# Patient Record
Sex: MALE | Race: OTHER | Employment: FULL TIME | URBAN - METROPOLITAN AREA
[De-identification: names, ages, dates, MRNs, and addresses within clinical notes are randomized per-mention and may not be internally consistent; named-entity substitution may affect disease eponyms.]

---

## 2023-11-22 ENCOUNTER — OFFICE VISIT (OUTPATIENT)
Age: 63
End: 2023-11-22
Payer: COMMERCIAL

## 2023-11-22 VITALS
SYSTOLIC BLOOD PRESSURE: 152 MMHG | DIASTOLIC BLOOD PRESSURE: 100 MMHG | WEIGHT: 227 LBS | HEART RATE: 83 BPM | HEIGHT: 64 IN | BODY MASS INDEX: 38.76 KG/M2

## 2023-11-22 DIAGNOSIS — R06.02 SOB (SHORTNESS OF BREATH): Primary | ICD-10-CM

## 2023-11-22 DIAGNOSIS — I10 HYPERTENSION, UNSPECIFIED TYPE: ICD-10-CM

## 2023-11-22 DIAGNOSIS — R07.89 ATYPICAL CHEST PAIN: ICD-10-CM

## 2023-11-22 DIAGNOSIS — R06.83 SNORING: ICD-10-CM

## 2023-11-22 PROCEDURE — 99204 OFFICE O/P NEW MOD 45 MIN: CPT | Performed by: INTERNAL MEDICINE

## 2023-11-22 PROCEDURE — 3080F DIAST BP >= 90 MM HG: CPT | Performed by: INTERNAL MEDICINE

## 2023-11-22 PROCEDURE — 93000 ELECTROCARDIOGRAM COMPLETE: CPT | Performed by: INTERNAL MEDICINE

## 2023-11-22 PROCEDURE — 3077F SYST BP >= 140 MM HG: CPT | Performed by: INTERNAL MEDICINE

## 2023-11-22 RX ORDER — TAMSULOSIN HYDROCHLORIDE 0.4 MG/1
0.4 CAPSULE ORAL NIGHTLY
COMMUNITY
Start: 2023-10-27

## 2023-11-22 RX ORDER — LISINOPRIL 40 MG/1
40 TABLET ORAL DAILY
COMMUNITY
Start: 2023-11-07

## 2023-11-22 RX ORDER — AMLODIPINE BESYLATE 10 MG/1
10 TABLET ORAL DAILY
Qty: 30 TABLET | Refills: 11 | COMMUNITY
Start: 2023-11-15 | End: 2024-11-06

## 2023-11-22 RX ORDER — CARVEDILOL 6.25 MG/1
6.25 TABLET ORAL 2 TIMES DAILY
COMMUNITY
Start: 2023-11-15

## 2023-11-23 DIAGNOSIS — I10 HYPERTENSION, UNSPECIFIED TYPE: Primary | ICD-10-CM

## 2023-11-23 LAB
ANION GAP SERPL CALC-SCNC: 5 MMOL/L (ref 2–11)
BUN SERPL-MCNC: 9 MG/DL (ref 8–23)
CALCIUM SERPL-MCNC: 9 MG/DL (ref 8.3–10.4)
CHLORIDE SERPL-SCNC: 106 MMOL/L (ref 101–110)
CO2 SERPL-SCNC: 28 MMOL/L (ref 21–32)
CREAT SERPL-MCNC: 0.9 MG/DL (ref 0.8–1.5)
GLUCOSE SERPL-MCNC: 127 MG/DL (ref 65–100)
MAGNESIUM SERPL-MCNC: 2.3 MG/DL (ref 1.8–2.4)
POTASSIUM SERPL-SCNC: 3.9 MMOL/L (ref 3.5–5.1)
SODIUM SERPL-SCNC: 139 MMOL/L (ref 133–143)

## 2023-11-23 RX ORDER — TRIAMTERENE AND HYDROCHLOROTHIAZIDE 37.5; 25 MG/1; MG/1
1 TABLET ORAL DAILY
Qty: 90 TABLET | Refills: 3 | Status: SHIPPED | OUTPATIENT
Start: 2023-11-23

## 2023-11-27 ENCOUNTER — TELEPHONE (OUTPATIENT)
Age: 63
End: 2023-11-27

## 2023-11-27 NOTE — TELEPHONE ENCOUNTER
Bhupinder Guzman of lab results and Dr. Melly Bills response. Bhupinder Guzman that patient may go to any [facility] outpatient lab in 1 week for BMP and magnesium. Tiff Obregon verbalized understanding.

## 2023-11-27 NOTE — TELEPHONE ENCOUNTER
----- Message from Jack Cee MD sent at 11/23/2023  8:20 AM EST -----  Please let the patient know that his lab work is acceptable for a thiazide diuretic; therefore, I started the medication. He can pick it up from the pharmacy. I ordered a chemistry profile to be repeated in 1 week. Please encourage him to get his lab work at that time.

## 2023-12-07 DIAGNOSIS — I10 HYPERTENSION, UNSPECIFIED TYPE: ICD-10-CM

## 2023-12-07 DIAGNOSIS — I10 HYPERTENSION, UNSPECIFIED TYPE: Primary | ICD-10-CM

## 2023-12-07 LAB
ANION GAP SERPL CALC-SCNC: 5 MMOL/L (ref 2–11)
BUN SERPL-MCNC: 19 MG/DL (ref 8–23)
CALCIUM SERPL-MCNC: 9.4 MG/DL (ref 8.3–10.4)
CHLORIDE SERPL-SCNC: 106 MMOL/L (ref 103–113)
CO2 SERPL-SCNC: 28 MMOL/L (ref 21–32)
CREAT SERPL-MCNC: 1.5 MG/DL (ref 0.8–1.5)
GLUCOSE SERPL-MCNC: 107 MG/DL (ref 65–100)
MAGNESIUM SERPL-MCNC: 2.4 MG/DL (ref 1.8–2.4)
POTASSIUM SERPL-SCNC: 3.9 MMOL/L (ref 3.5–5.1)
SODIUM SERPL-SCNC: 139 MMOL/L (ref 136–146)

## 2023-12-07 RX ORDER — CARVEDILOL 12.5 MG/1
12.5 TABLET ORAL 2 TIMES DAILY
Qty: 180 TABLET | Refills: 3 | Status: SHIPPED | OUTPATIENT
Start: 2023-12-07

## 2023-12-08 ENCOUNTER — TELEPHONE (OUTPATIENT)
Age: 63
End: 2023-12-08

## 2023-12-08 NOTE — TELEPHONE ENCOUNTER
Advised wife, Renetta Guidry, of lab results and Dr. Naga Tsang response. Advised Galina that carvedilol 12.5 mg BID has [facility] [address] [facility] [place]. Ray Crews that patient should go to any [facility] outpatient lab in 2 weeks. Renetta Guidry verbalized understanding.

## 2023-12-08 NOTE — TELEPHONE ENCOUNTER
----- Message from Justice Nicholson MD sent at 12/7/2023  8:45 PM EST -----  Please the patient know he has a mild ALEJANDRO on HCTZ; therefore, I discontinued it, increased Coreg to 12.5 mg BID, and ordered a repeat chemistry profile in 2 weeks. Please encourage him to get his lab work at that time.

## 2023-12-13 ENCOUNTER — TELEPHONE (OUTPATIENT)
Age: 63
End: 2023-12-13

## 2023-12-13 NOTE — TELEPHONE ENCOUNTER
Feet swelling bad,Feels really bad,headache,stomach pain Thinks its the Carvedilol he is taking Please call

## 2023-12-13 NOTE — TELEPHONE ENCOUNTER
Feeling very bad with increased fatigue, headache, increased SOB on exertion, abdominal pain with very bloated feeling in abdomen, and increased swelling in feet and lower legs up to knees since he increased carvedilol to 12.5 mg BID on 12/8/23. Symptoms increase a short time after taking carvedilol in morning and in evening. Difficulty walking due to very bad swelling in feet and lower legs. Feet and legs feel heavy. No redness, heat, or pain in feet or lower legs. Has not been weighing at home. Wife, Valerie Rivas, does not have BP or HR readings, but states patient told her BP and HR have been okay at home. Patient felt okay with none of these symtoms when taking Maxzide 37.5-25 mg qd and carvedilol 6.25 mg BID. Taking amlodipine 10 mg qd, carvedilol 12.5 mg BID, and lisinopril 40 mg qd. Valerie Rivas asks if patient may D/C carvedilol and take another med and asks for any other recommendations.

## 2023-12-13 NOTE — TELEPHONE ENCOUNTER
Cole Medina of Dr. Anselmo Todd response. Michael Mcmahon verbalized understanding. She states patient did not take carvedilol, today. She states she will check BP and HR, tonight, and call tomorrow with 3-4 days of BP and HR readings.

## 2023-12-13 NOTE — TELEPHONE ENCOUNTER
MD Megan Wesley RN  Caller: Unspecified (Today, 10:01 AM)  These symptoms are unlikely from the medication. However, it would be helpful to know his HR on the Coreg. The diuretic was discontinued because of an ALEJANDRO. Thus, the risks outweigh the benefits of resuming a diuretic.

## 2023-12-14 NOTE — TELEPHONE ENCOUNTER
BP readings since November 2023-145/97, 120/83, 128/83, 135/89, 145/87, 147/90. HR since November 2023-78, 85, 65, 74, 70, 85. Symptoms continue, as above. Some swelling in both arms from elbows to hands. Michael Mcmahon is very concerned that patient seems to be retaining fluid. Has not taken carvedilol 12.5 mg BID, yesterday or today. Michael Mcmahon asks for recommendations for high BP and symptoms as listed, above.

## 2023-12-15 NOTE — TELEPHONE ENCOUNTER
Kiran Huynh of Dr. Renu Wang response. Kiran Huynh that patient should go [place] [facility] [place]. Je Doty verbalized understanding.

## 2023-12-15 NOTE — TELEPHONE ENCOUNTER
Left message with Bre Montes De Oca at [address] for Alicia Mojica to call this triage nurse.

## 2023-12-15 NOTE — TELEPHONE ENCOUNTER
Casey Sow MD  You15 hours ago (5:29 PM)       He can get the lab work early, so we know what is safe to start in lieu of Coreg. Casey Sow MD  You15 hours ago (5:28 PM)       Discontinue Coreg. Awaiting lab work.

## 2024-12-02 ENCOUNTER — OFFICE VISIT (OUTPATIENT)
Age: 64
End: 2024-12-02
Payer: COMMERCIAL

## 2024-12-02 DIAGNOSIS — M79.645 PAIN OF LEFT THUMB: Primary | ICD-10-CM

## 2024-12-02 DIAGNOSIS — S62.639A CLOSED FRACTURE OF TUFT OF DISTAL PHALANX OF FINGER: ICD-10-CM

## 2024-12-02 PROCEDURE — 99203 OFFICE O/P NEW LOW 30 MIN: CPT | Performed by: ORTHOPAEDIC SURGERY

## 2024-12-02 RX ORDER — IBUPROFEN 800 MG/1
800 TABLET, FILM COATED ORAL EVERY 8 HOURS PRN
Qty: 20 TABLET | Refills: 0 | Status: SHIPPED | OUTPATIENT
Start: 2024-12-02

## 2024-12-03 NOTE — PROGRESS NOTES
Orthopaedic Hand Clinic Note    Name: Pepe Recinos  YOB: 1960  Gender: male  MRN: [id number]      CC: Patient referred for evaluation of upper extremity pain    HPI: Pepe Recinos is a 64 y.o. male with a chief complaint of injury to his left thumb which occurred at work on 11/14/2024 when it was crushed.  He was seen in urgent care.  He was given a prescription for antibiotics..      ROS/Meds/PSH/PMH/FH/SH: I personally reviewed the patients standard intake form.  Pertinents are discussed in the HPI    Physical Examination:    Musculoskeletal Exam:  Examination on the left upper extremity demonstrates cap refill < 5 seconds in all fingers, skin is intact, there is swelling and tenderness palpation of the distal phalanx of the thumb.. Trephination hole was placed in the thumb nail plate.  Nail plate is intact and has a 80% subungual hematoma.  He is able to fire [employer] and [employer].  Light touch sensation is intact throughout    Imaging / Electrodiagnostic Tests:     Finger XR: AP, Lateral, Oblique views     Clinical Indication:  1. Pain of left thumb    2. Closed fracture of tuft of distal phalanx of finger           Report: AP, lateral, oblique x-ray of the left thumb demonstrates small distal tuft fracture of the thumb    Impression: as above     Clara Morales MD         Assessment:     ICD-10-CM    1. Pain of left thumb  M79.645 XR FINGER LEFT (MIN 2 VIEWS)      2. Closed fracture of tuft of distal phalanx of finger  S62.639A ibuprofen (ADVIL;MOTRIN) 800 MG tablet          Plan:   We discussed the diagnosis and different treatment options. We discussed observation, therapy, antiinflammatory medications and other pertinent treatment modalities.    After discussing in detail the patient elects to proceed with nonsurgical treatment.  I given him a prescription for 800 mg of ibuprofen per his request.  He can perform range of motion as tolerated.  He will eventually lose his thumbnail, and anyone

## 2024-12-11 ENCOUNTER — TELEPHONE (OUTPATIENT)
Dept: ORTHOPEDIC SURGERY | Age: 64
End: 2024-12-11

## 2024-12-11 ENCOUNTER — CLINICAL DOCUMENTATION (OUTPATIENT)
Dept: ORTHOPEDIC SURGERY | Age: 64
End: 2024-12-11

## 2024-12-11 NOTE — TELEPHONE ENCOUNTER
He is returning a call about his work form and also needs a refill of his ibuprofen sent to the pharmacy on file.

## 2024-12-12 ENCOUNTER — TELEPHONE (OUTPATIENT)
Dept: ORTHOPEDIC SURGERY | Age: 64
End: 2024-12-12

## 2024-12-12 NOTE — TELEPHONE ENCOUNTER
Pt called back  he would like to talk with you again. Pt is confused on going back to work on Monday. Pt says he is still in pain.

## 2024-12-12 NOTE — TELEPHONE ENCOUNTER
I spoke with Mr. Recinos to get clarification of the dates that need to be covered on his FMLA.  He stated that the DOI was 11/14/24 but he tried to work for several days and his company told him not to come in beginning 11/25/24.  I asked about the work note restrictions and he stated that his work place did not have one handed work. I explained that the work note states he can go back to full duty with no restrictions on 12/16/2024 so the paperwork will cover him from 11/25/24 to 12/15/2024.  He voiced understanding.

## 2024-12-12 NOTE — TELEPHONE ENCOUNTER
Patient said he was returning call about his form, his first date OOW was 11-25, call if any questions

## 2024-12-17 ENCOUNTER — OFFICE VISIT (OUTPATIENT)
Age: 64
End: 2024-12-17

## 2024-12-17 DIAGNOSIS — S62.639A CLOSED FRACTURE OF TUFT OF DISTAL PHALANX OF FINGER: ICD-10-CM

## 2024-12-17 RX ORDER — IBUPROFEN 800 MG/1
800 TABLET, FILM COATED ORAL EVERY 8 HOURS PRN
Qty: 20 TABLET | Refills: 0 | Status: SHIPPED | OUTPATIENT
Start: 2024-12-17

## 2024-12-18 ENCOUNTER — TELEPHONE (OUTPATIENT)
Dept: ORTHOPEDIC SURGERY | Age: 64
End: 2024-12-18

## 2024-12-18 ENCOUNTER — CLINICAL DOCUMENTATION (OUTPATIENT)
Dept: ORTHOPEDIC SURGERY | Age: 64
End: 2024-12-18

## 2024-12-18 NOTE — TELEPHONE ENCOUNTER
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 He needs a note stating he is cleared to work.   Can you email to [email or web address].

## 2024-12-19 NOTE — PROGRESS NOTES
Orthopaedic Hand Clinic Note    Name: Pepe Recinos  YOB: 1960  Gender: male  MRN: [id number]      Follow up visit:   1. Closed fracture of tuft of distal phalanx of finger        HPI: Pepe Recinos is a 64 y.o. male who is following up for left thumb distal tuft fracture.  He states he continues to have pain in his thumb and cold intolerance.  He says he cannot drive, although he did drive himself here today..      ROS/Meds/PSH/PMH/FH/SH: I personally reviewed the patients standard intake form.  Pertinents are discussed in the HPI    Physical Examination:    Musculoskeletal Examination:  Examination on the left upper extremity demonstrates cap refill < 5 seconds in all fingers, skin is intact, there is tenderness to palpation at the thumb tip.  There is a nearly complete subungual hematoma.  There is no active drainage.  There is no erythema.  He is able to fire [employer] and [employer].  Thumb range of motion is slightly limited..    Imaging / Electrodiagnostic Tests:     none    Assessment:     ICD-10-CM    1. Closed fracture of tuft of distal phalanx of finger  S62.639A ibuprofen (ADVIL;MOTRIN) 800 MG tablet     Amb External Referral To Occupational Therapy          Plan:   We discussed the diagnosis and different treatment options. We discussed observation, therapy, antiinflammatory medications and other pertinent treatment modalities.    He is requesting another prescription for ibuprofen so I have provided this form today.  He had a small distal tuft fracture, and it has been over 4 weeks since that injury so I do feel it is appropriate for him to return to work full duty.  Fingertip crush injuries can remain hypersensitive, particularly to cold sometimes for a year after injury.  Will refer him to therapy as well for range of motion, strengthening and desensitization.  He will follow-up as needed    Patient voiced accordance and understanding of the information provided and the formulated plan.

## 2025-01-24 ENCOUNTER — CLINICAL DOCUMENTATION (OUTPATIENT)
Age: 65
End: 2025-01-24

## 2025-06-18 ENCOUNTER — TELEPHONE (OUTPATIENT)
Age: 65
End: 2025-06-18

## 2025-06-18 NOTE — TELEPHONE ENCOUNTER
Shante called stating they want to schedule a telephone call with Dr. Morales to discuss this patient.  Please call her back at [phone].

## 2025-07-02 NOTE — TELEPHONE ENCOUNTER
I called and spoke with Shante from the [employer] ([phone]).  I advised her that we schedule telephone conferences upon receipt of prepayment.  I advised I will fax that to her at [phone] and that I will call her back once we receive the prepayment.  Shante voiced understanding.

## 2025-07-23 ENCOUNTER — TELEPHONE (OUTPATIENT)
Age: 65
End: 2025-07-23

## 2025-08-05 ENCOUNTER — TELEPHONE (OUTPATIENT)
Dept: ORTHOPEDIC SURGERY | Age: 65
End: 2025-08-05